# Patient Record
(demographics unavailable — no encounter records)

---

## 2025-06-09 NOTE — HISTORY OF PRESENT ILLNESS
[FreeTextEntry1] : Patient presents for skin examination.  [de-identified] : patient with hx of many BCC's.

## 2025-06-09 NOTE — PHYSICAL EXAM
[Alert] : alert No Vaccines Administered. [Oriented x 3] : ~L oriented x 3 [Well Nourished] : well nourished [Full Body Skin Exam Performed] : performed [FreeTextEntry3] : A full skin exam was performed including the scalp, face (including lips, ears, nose and eyes), neck, chest, abdomen, back, buttocks, upper extremities and lower extremities.  The patient declined examination of the genitalia.   The exam revealed the following benign growths: Seborrheic keratoses. Lentigines.  Erythematous papules and macules of the neck and trunk.

## 2025-06-09 NOTE — ASSESSMENT
[FreeTextEntry1] : A complete skin examination was performed.  We discussed the importance of photoprotection, including the use of hats, protective clothing and sunscreens with an SPF of at least 30.  Sun avoidance was also discussed.  The ABCDE's of melanoma was discussed with the patient.  Regular skin exams are encouraged.  Likely BCC's - multiple. plan D&C for both sites today plus additional clinical BCC's on f/u.